# Patient Record
Sex: MALE | Race: WHITE | NOT HISPANIC OR LATINO | URBAN - METROPOLITAN AREA
[De-identification: names, ages, dates, MRNs, and addresses within clinical notes are randomized per-mention and may not be internally consistent; named-entity substitution may affect disease eponyms.]

---

## 2020-03-18 ENCOUNTER — APPOINTMENT (OUTPATIENT)
Dept: URBAN - METROPOLITAN AREA SURGERY 18 | Age: 75
Setting detail: DERMATOLOGY
End: 2020-03-19

## 2020-03-18 VITALS — DIASTOLIC BLOOD PRESSURE: 80 MMHG | HEART RATE: 85 BPM | SYSTOLIC BLOOD PRESSURE: 137 MMHG

## 2020-03-18 PROBLEM — C44.212 BASAL CELL CARCINOMA OF SKIN OF RIGHT EAR AND EXTERNAL AURICULAR CANAL: Status: ACTIVE | Noted: 2020-03-18

## 2020-03-18 PROCEDURE — 17312 MOHS ADDL STAGE: CPT

## 2020-03-18 PROCEDURE — 17311 MOHS 1 STAGE H/N/HF/G: CPT

## 2020-03-18 PROCEDURE — 15260 FTH/GFT FR N/E/E/L 20 SQCM/<: CPT

## 2020-03-18 PROCEDURE — OTHER PRESCRIPTION: OTHER

## 2020-03-18 PROCEDURE — OTHER REFERRAL CORRESPONDENCE: OTHER

## 2020-03-18 PROCEDURE — OTHER MOHS SURGERY: OTHER

## 2020-03-26 ENCOUNTER — APPOINTMENT (OUTPATIENT)
Dept: URBAN - METROPOLITAN AREA SURGERY 18 | Age: 75
Setting detail: DERMATOLOGY
End: 2020-03-28

## 2020-03-26 DIAGNOSIS — Z48.817 ENCOUNTER FOR SURGICAL AFTERCARE FOLLOWING SURGERY ON THE SKIN AND SUBCUTANEOUS TISSUE: ICD-10-CM

## 2020-03-26 PROCEDURE — OTHER POST-OP WOUND CHECK: OTHER

## 2020-03-26 PROCEDURE — 99024 POSTOP FOLLOW-UP VISIT: CPT

## 2020-03-26 ASSESSMENT — LOCATION ZONE DERM: LOCATION ZONE: EAR

## 2020-03-26 ASSESSMENT — LOCATION SIMPLE DESCRIPTION DERM: LOCATION SIMPLE: RIGHT EAR

## 2020-03-26 ASSESSMENT — LOCATION DETAILED DESCRIPTION DERM: LOCATION DETAILED: RIGHT SUPERIOR POSTERIOR HELIX

## 2020-04-02 ENCOUNTER — APPOINTMENT (OUTPATIENT)
Dept: URBAN - METROPOLITAN AREA SURGERY 18 | Age: 75
Setting detail: DERMATOLOGY
End: 2020-04-04

## 2020-04-02 DIAGNOSIS — Z48.817 ENCOUNTER FOR SURGICAL AFTERCARE FOLLOWING SURGERY ON THE SKIN AND SUBCUTANEOUS TISSUE: ICD-10-CM

## 2020-04-02 PROCEDURE — OTHER POST-OP WOUND CHECK: OTHER

## 2020-04-02 PROCEDURE — 99024 POSTOP FOLLOW-UP VISIT: CPT

## 2020-04-02 ASSESSMENT — LOCATION ZONE DERM: LOCATION ZONE: EAR

## 2020-04-02 ASSESSMENT — LOCATION DETAILED DESCRIPTION DERM: LOCATION DETAILED: RIGHT SUPERIOR POSTERIOR HELIX

## 2020-04-02 ASSESSMENT — LOCATION SIMPLE DESCRIPTION DERM: LOCATION SIMPLE: RIGHT EAR

## 2020-04-02 NOTE — PROCEDURE: POST-OP WOUND CHECK
Additional Comments: Graft site appears well vascularized. Continue daily dressing changes until fully healed. May start to get wet daily.\\n\\nDr. Lloyd's note - seen/agree with above. Discussed anterolateral tilt to ear at present point in healing process. We discussed bandaging and avoiding heavy objects (i.e glasses) that may add additional weight to the ear and fix the ear in a \"cupped\" position. We expect much of this will resolve as the graft contracts. Given the extent of the tumor (involving much of the posterior ear/postauricular sulcus and postauricular neck) some cupping may remain and could be corrected in the future with surgical revision. The patient was shown how to bandage the ear in proper position so that the ear is somewhat casted in place. Should he notice any increase in cupping/anterolateral displacement of the ear he will call and return to clinic for evaluation and potential intervention. Otherwise given the current community COVID transmission the patient will follow up in clinic when safer to do so (~3 months) but knows to call and return to clinic sooner if he has any complication/concern.
Wound Evaluated By: Dr. Cliff Desai
Add 31220 Cpt? (Important Note: In 2017 The Use Of 75950 Is Being Tracked By Cms To Determine Future Global Period Reimbursement For Global Periods): yes
Detail Level: Detailed

## 2021-03-28 NOTE — PROCEDURE: MOHS SURGERY
183 Mucosal Advancement Flap Text: Given the location of the defect, shape of the defect and the proximity to free margins a mucosal advancement flap was deemed most appropriate. Incisions were made with a 15 blade scalpel in the appropriate fashion along the cutaneous vermilion border and the mucosal lip. The remaining actinically damaged mucosal tissue was excised.  The mucosal advancement flap was then elevated to the gingival sulcus with care taken to preserve the neurovascular structures and advanced into the primary defect. Care was taken to ensure that precise realignment of the vermilion border was achieved.

## 2022-01-21 NOTE — PROCEDURE: MOHS SURGERY
4 = No assist / stand by assistance Split-Thickness Skin Graft Text: The defect edges were debeveled with a #15 scalpel blade.  Given the location of the defect, shape of the defect and the proximity to free margins a split thickness skin graft was deemed most appropriate.  Using a sterile surgical marker, the primary defect shape was transferred to the donor site. The split thickness graft was then harvested.  The skin graft was then placed in the primary defect and oriented appropriately.

## 2022-03-15 ENCOUNTER — APPOINTMENT (OUTPATIENT)
Dept: URBAN - METROPOLITAN AREA SURGERY 18 | Age: 77
Setting detail: DERMATOLOGY
End: 2022-03-16

## 2022-03-15 VITALS — DIASTOLIC BLOOD PRESSURE: 82 MMHG | HEART RATE: 103 BPM | SYSTOLIC BLOOD PRESSURE: 122 MMHG

## 2022-03-15 PROBLEM — C44.212 BASAL CELL CARCINOMA OF SKIN OF RIGHT EAR AND EXTERNAL AURICULAR CANAL: Status: ACTIVE | Noted: 2022-03-15

## 2022-03-15 PROCEDURE — OTHER COUNSELING: OTHER

## 2022-03-15 PROCEDURE — 17311 MOHS 1 STAGE H/N/HF/G: CPT

## 2022-03-15 PROCEDURE — OTHER MOHS SURGERY: OTHER

## 2022-03-15 PROCEDURE — OTHER REFERRAL CORRESPONDENCE: OTHER

## 2022-03-15 PROCEDURE — 17312 MOHS ADDL STAGE: CPT

## 2022-03-15 PROCEDURE — 15260 FTH/GFT FR N/E/E/L 20 SQCM/<: CPT

## 2022-03-15 NOTE — PROCEDURE: MOHS SURGERY
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

## 2022-03-15 NOTE — HPI: PROCEDURE (MOHS)
Has The Growth Been Previously Biopsied?: has been previously biopsied
Additional History: The patient has previously had many lesions on the right ear treated with liquid nitrogen which may have overlapped with the surgical site.
Year Removed: 1900

## 2022-03-16 ENCOUNTER — APPOINTMENT (OUTPATIENT)
Dept: URBAN - METROPOLITAN AREA SURGERY 18 | Age: 77
Setting detail: DERMATOLOGY
End: 2022-03-16

## 2022-03-16 DIAGNOSIS — Z48.817 ENCOUNTER FOR SURGICAL AFTERCARE FOLLOWING SURGERY ON THE SKIN AND SUBCUTANEOUS TISSUE: ICD-10-CM

## 2022-03-16 DIAGNOSIS — L76.21 POSTPROCEDURAL HEMORRHAGE OF SKIN AND SUBCUTANEOUS TISSUE FOLLOWING A DERMATOLOGIC PROCEDURE: ICD-10-CM

## 2022-03-16 PROCEDURE — OTHER DRESSING CHANGE: OTHER

## 2022-03-16 PROCEDURE — 99024 POSTOP FOLLOW-UP VISIT: CPT

## 2022-03-16 PROCEDURE — OTHER POST-OP WOUND CHECK: OTHER

## 2022-03-16 ASSESSMENT — LOCATION ZONE DERM: LOCATION ZONE: EAR

## 2022-03-16 ASSESSMENT — LOCATION SIMPLE DESCRIPTION DERM: LOCATION SIMPLE: RIGHT EAR

## 2022-03-16 ASSESSMENT — LOCATION DETAILED DESCRIPTION DERM: LOCATION DETAILED: RIGHT ANTIHELIX

## 2022-03-16 NOTE — PROCEDURE: POST-OP WOUND CHECK
Wound Evaluated By: Dr. Cliff Desai
Detail Level: Detailed
Add 13009 Cpt? (Important Note: In 2017 The Use Of 91789 Is Being Tracked By Cms To Determine Future Global Period Reimbursement For Global Periods): yes

## 2022-03-16 NOTE — PROCEDURE: DRESSING CHANGE
Wound Evaluated By: Dr. Cliff Desai
Detail Level: Detailed
Add 09148 Cpt? (Important Note: In 2017 The Use Of 18392 Is Being Tracked By Cms To Determine Future Global Period Reimbursement For Global Periods): no
Free Text Wound Car (Will Override Above): Patient returns to clinic for post op bleeding overnight from surgical site, Right Antihelix. Patient reports that site started to ooze last night, patient states that he put pressure to site for \"approximately 10 minutes\", then he reports he took an Ibuprofen, and went to bed. The next morning patient reports that the bandage was soaked with blood and continuing to run  down his neck. Patient also reports 10/10 pain and tenderness globally to ear. Patient was brought into clinic by wife. Bandage was soaked though with blood. Bandage was removed and large clot noted on top of graft site as well as focal slow oozing from superior portion of graft and focal slow oozing from second intention area. Clot was removed and  direct pressure was applied for 30 minutes. After pressure was applied bleeding stopped.  Patient was given 6cc of 1% lidocaine with 1:100,000 epinephrine and 408mcg clindamycin/ml and a 1:10 solution of 8.4% sodium bicarbonate to area for pain. Site was cleaned with sterile saline and surgical soap. Applied drysol to epidermal edges and focal areas for additional hemostasis. Applied gelfoam, Vaseline, Telfa pad, gauze, and Hypafix tape with pressure bandage along with a stockinette. Detailed postoperative instructions were given. Advised patient to keep head elevated, try Tylenol instead for pain, and reinforced instructions to hold pressure for at least 30 minutes if bleeding occurs. Advised patient to call with any other complications or concerns. Should bleeding resume after hours will travel to nearest ER or urgent care. Patient will follow up one week post op or sooner of concerns arise.

## 2022-03-23 ENCOUNTER — APPOINTMENT (OUTPATIENT)
Dept: URBAN - METROPOLITAN AREA SURGERY 18 | Age: 77
Setting detail: DERMATOLOGY
End: 2022-03-25

## 2022-03-23 VITALS — HEART RATE: 86 BPM | SYSTOLIC BLOOD PRESSURE: 139 MMHG | DIASTOLIC BLOOD PRESSURE: 91 MMHG

## 2022-03-23 DIAGNOSIS — Z48.817 ENCOUNTER FOR SURGICAL AFTERCARE FOLLOWING SURGERY ON THE SKIN AND SUBCUTANEOUS TISSUE: ICD-10-CM

## 2022-03-23 PROBLEM — C44.219 BASAL CELL CARCINOMA OF SKIN OF LEFT EAR AND EXTERNAL AURICULAR CANAL: Status: ACTIVE | Noted: 2022-03-23

## 2022-03-23 PROCEDURE — 17311 MOHS 1 STAGE H/N/HF/G: CPT | Mod: 79

## 2022-03-23 PROCEDURE — OTHER COUNSELING: OTHER

## 2022-03-23 PROCEDURE — OTHER REFERRAL CORRESPONDENCE: OTHER

## 2022-03-23 PROCEDURE — 99024 POSTOP FOLLOW-UP VISIT: CPT

## 2022-03-23 PROCEDURE — OTHER POST-OP WOUND CHECK: OTHER

## 2022-03-23 PROCEDURE — OTHER MOHS SURGERY: OTHER

## 2022-03-23 ASSESSMENT — LOCATION DETAILED DESCRIPTION DERM: LOCATION DETAILED: RIGHT ANTIHELIX

## 2022-03-23 ASSESSMENT — LOCATION SIMPLE DESCRIPTION DERM: LOCATION SIMPLE: RIGHT EAR

## 2022-03-23 ASSESSMENT — LOCATION ZONE DERM: LOCATION ZONE: EAR

## 2022-03-23 NOTE — PROCEDURE: MOHS SURGERY
23-Dec-2021 20:00 Consent 3/Introductory Paragraph: I gave the patient a chance to ask questions they had about the procedure.  Following this I explained the Mohs procedure and consent was obtained. The risks, benefits and alternatives to therapy were discussed in detail. Specifically, the risks of infection, scarring, bleeding, prolonged wound healing, incomplete removal, allergy to anesthesia, nerve injury and recurrence were addressed. Prior to the procedure, the treatment site was clearly identified and confirmed by the patient. All components of Universal Protocol/PAUSE Rule completed.

## 2022-03-23 NOTE — PROCEDURE: POST-OP WOUND CHECK
Additional Comments: Patient presents for a 1 week post Mohs wound check. Graft is healing appropriately and is well vascularized. Wound was cleaned with hibiclens and rinsed with sterile saline. Applied Vaseline, telfa, and hypafix tape. Advised patient to leave bandage in place for 1 week. Patient to follow-up in 1 week with next Mohs surgery.

## 2022-03-23 NOTE — PROCEDURE: MOHS SURGERY
Problem: At Risk for Falls  Goal: # Patient does not fall  Outcome: Outcome Met, Continue evaluating goal progress toward completion  Goal: # Takes action to control fall-related risks  Outcome: Outcome Met, Continue evaluating goal progress toward completion  Goal: # Verbalizes understanding of fall risk/precautions  Description: Document education using the patient education activity  Outcome: Outcome Met, Continue evaluating goal progress toward completion     Problem: At Risk for Injury Due to Fall  Goal: # Patient does not fall  Outcome: Outcome Met, Continue evaluating goal progress toward completion  Goal: # Takes action to control condition specific risks  Outcome: Outcome Met, Continue evaluating goal progress toward completion  Goal: # Verbalizes understanding of fall-related injury personal risks  Description: Document education using the patient education activity  Outcome: Outcome Met, Continue evaluating goal progress toward completion     Problem: Pain  Goal: #Acceptable pain level achieved/maintained at rest using NRS/Faces  Description: This goal is used for patients who can self-report.  Acceptable means the level is at or below the identified comfort/function goal.  Outcome: Outcome Met, Continue evaluating goal progress toward completion  Goal: # Acceptable pain level achieved/maintained at rest using NRS/Faces without oversedation (opioid naive or PCA/Epidural infusion)  Description: This goal is used if Opioid-naïve or on PCA/Epidural Infusion.  Outcome: Outcome Met, Continue evaluating goal progress toward completion  Goal: # Acceptable pain level achieved/maintained with activity using NRS/Faces  Description: This goal is used for patients who can self-report and are not achieving acceptable pain control during activity.  Outcome: Outcome Met, Continue evaluating goal progress toward completion  Goal: Acceptable pain/comfort level is achieved/maintained at rest (based on Pain Behaviors  Scale)  Description: This goal is used for patients who are not able to self-report pain and are assessed for pain using the Pain Behaviors Scale  Outcome: Outcome Met, Continue evaluating goal progress toward completion  Goal: Acceptable pain/comfort level is achieved/maintained at rest based on PAINAID scale (Dementia)  Description: This goal is used for patients who are not able to self-report pain, have dementia, and assessed using the PAINAD scale.  Outcome: Outcome Met, Continue evaluating goal progress toward completion  Goal: Acceptable pain/comfort level is achieved/maintained at rest (based on pediatric behavior tool: NIPS, NPASS, or FLACC)  Description: This goal is used for pediatric patients who are not able to self report pain.  Outcome: Outcome Met, Continue evaluating goal progress toward completion  Goal: # Verbalizes understanding of pain management  Description: Documented in Patient Education Activity  Outcome: Outcome Met, Continue evaluating goal progress toward completion     Problem: Seizures  Goal: Seizure activity controlled  Outcome: Outcome Met, Continue evaluating goal progress toward completion  Goal: Protected from injury if a seizure occurs  Outcome: Outcome Met, Continue evaluating goal progress toward completion  Goal: Verbalizes understanding of seizures and treatment plan  Description: Document education using the patient education activity.   Outcome: Outcome Met, Continue evaluating goal progress toward completion     Problem: Hemodialysis  Goal: Fistula/graft intact as evidenced by presence of bruit & thrill  Outcome: Outcome Met, Continue evaluating goal progress toward completion  Goal: Vascular access device site free of signs & symptoms of infection  Outcome: Outcome Met, Continue evaluating goal progress toward completion  Goal: Dialysis: Safe, effective, and comfortable hemodialysis treatment (Hemodialysis nurse only)  Outcome: Outcome Met, Continue evaluating goal  progress toward completion  Goal: Dialysis: Free of complications related to initiation/termination of dialysis (Hemodialysis nurse only)  Outcome: Outcome Met, Continue evaluating goal progress toward completion  Goal: Verbalizes understanding of hemodialysis care  Description: Document on Patient Education Activity  Outcome: Outcome Met, Continue evaluating goal progress toward completion     Problem: Diabetes  Goal: Glycemic balance achieved/maintained  Description: Goal is to maintain blood sugar within range with no episodes of hypoglycemia  Outcome: Outcome Met, Continue evaluating goal progress toward completion  Goal: Verbalizes/demonstrates understanding of Diabetes  Description: Document on Patient Education Activity  Outcome: Outcome Met, Continue evaluating goal progress toward completion  Goal: Demonstrates ability to self-administer insulin  Description: Document on Patient Education Activity  Outcome: Outcome Met, Continue evaluating goal progress toward completion      [place] Border Text: The closure involved the vermilion border.

## 2022-03-23 NOTE — PROCEDURE: POST-OP WOUND CHECK
Add 17447 Cpt? (Important Note: In 2017 The Use Of 56967 Is Being Tracked By Cms To Determine Future Global Period Reimbursement For Global Periods): yes

## 2022-03-30 ENCOUNTER — APPOINTMENT (OUTPATIENT)
Dept: URBAN - METROPOLITAN AREA SURGERY 18 | Age: 77
Setting detail: DERMATOLOGY
End: 2022-03-30

## 2022-03-30 VITALS — DIASTOLIC BLOOD PRESSURE: 88 MMHG | SYSTOLIC BLOOD PRESSURE: 140 MMHG | HEART RATE: 88 BPM

## 2022-03-30 DIAGNOSIS — Z48.817 ENCOUNTER FOR SURGICAL AFTERCARE FOLLOWING SURGERY ON THE SKIN AND SUBCUTANEOUS TISSUE: ICD-10-CM

## 2022-03-30 PROBLEM — C44.319 BASAL CELL CARCINOMA OF SKIN OF OTHER PARTS OF FACE: Status: ACTIVE | Noted: 2022-03-30

## 2022-03-30 PROCEDURE — 99024 POSTOP FOLLOW-UP VISIT: CPT

## 2022-03-30 PROCEDURE — OTHER COUNSELING: OTHER

## 2022-03-30 PROCEDURE — 17311 MOHS 1 STAGE H/N/HF/G: CPT | Mod: 79

## 2022-03-30 PROCEDURE — 13132 CMPLX RPR F/C/C/M/N/AX/G/H/F: CPT | Mod: 79

## 2022-03-30 PROCEDURE — OTHER REFERRAL CORRESPONDENCE: OTHER

## 2022-03-30 PROCEDURE — OTHER MOHS SURGERY: OTHER

## 2022-03-30 PROCEDURE — OTHER POST-OP WOUND CHECK: OTHER

## 2022-03-30 ASSESSMENT — LOCATION SIMPLE DESCRIPTION DERM
LOCATION SIMPLE: RIGHT EAR
LOCATION SIMPLE: LEFT EAR

## 2022-03-30 ASSESSMENT — LOCATION DETAILED DESCRIPTION DERM
LOCATION DETAILED: LEFT TRIANGULAR FOSSA
LOCATION DETAILED: RIGHT ANTIHELIX

## 2022-03-30 ASSESSMENT — LOCATION ZONE DERM: LOCATION ZONE: EAR

## 2022-03-30 NOTE — PROCEDURE: POST-OP WOUND CHECK
Additional Comments: Patient presents for a 15 day post Mohs wound check. Graft site is well vascularized and contoured. Wound was cleaned with hibiclens and rinsed with sterile saline. Applied Vaseline, telfa, and hypafix tape. Advised patient to continue daily cleaning and dressing changes until fully healed.
Wound Evaluated By: Dr. Cliff Desai
Additional Comments: Patient presents for a 1 week post Mohs wound check. Surgical site is granulating appropriately. Wound was cleaned with hibiclens and rinsed with sterile saline. Applied Vaseline, telfa, and hypafix tape. Advised patient to start daily cleaning and dressing changes until fully healed.
Add 66542 Cpt? (Important Note: In 2017 The Use Of 03989 Is Being Tracked By Cms To Determine Future Global Period Reimbursement For Global Periods): yes
Detail Level: Detailed

## 2022-04-14 ENCOUNTER — APPOINTMENT (OUTPATIENT)
Dept: URBAN - METROPOLITAN AREA SURGERY 18 | Age: 77
Setting detail: DERMATOLOGY
End: 2022-04-18

## 2022-04-14 DIAGNOSIS — Z48.817 ENCOUNTER FOR SURGICAL AFTERCARE FOLLOWING SURGERY ON THE SKIN AND SUBCUTANEOUS TISSUE: ICD-10-CM

## 2022-04-14 PROCEDURE — 99024 POSTOP FOLLOW-UP VISIT: CPT

## 2022-04-14 PROCEDURE — OTHER POST-OP WOUND CHECK: OTHER

## 2022-04-14 ASSESSMENT — LOCATION ZONE DERM
LOCATION ZONE: FACE
LOCATION ZONE: EAR

## 2022-04-14 ASSESSMENT — LOCATION SIMPLE DESCRIPTION DERM
LOCATION SIMPLE: LEFT EAR
LOCATION SIMPLE: RIGHT EAR
LOCATION SIMPLE: LEFT CHEEK

## 2022-04-14 ASSESSMENT — LOCATION DETAILED DESCRIPTION DERM
LOCATION DETAILED: LEFT CENTRAL MALAR CHEEK
LOCATION DETAILED: RIGHT ANTIHELIX
LOCATION DETAILED: LEFT TRIANGULAR FOSSA

## 2022-04-14 NOTE — PROCEDURE: POST-OP WOUND CHECK
Add 47298 Cpt? (Important Note: In 2017 The Use Of 11321 Is Being Tracked By Cms To Determine Future Global Period Reimbursement For Global Periods): yes
Additional Comments: Patient presents for 3 week wound check. Site has some serosanguineous crusting visible with appropriate granulation tissue covering the cartilaginous wound bed. No signs/symptoms of poor wound healing or infection. Patient was educated to keep dressing on site until fully healed. Site was dressed with Vaseline \\n,telfa and tape. Follow up in 4 weeks.
Detail Level: Detailed
Additional Comments: Patient presents for 4 week wound check. Site has mild serosanguineous crusting visible. Patient states he has only been bandaging the area intermittently. Patient was educated to keep dressing on site until fully healed. Site was dressed with Vaseline,telfa and tape. Follow up in 3-4 weeks, sooner with any change, new onset symptoms at surgical site.
Additional Comments: Patient presents for 2 week wound check, site is well approximated. Patient no longer needs bandage and can begin gentle massage is 3 weeks. Patient educated on evidence of spitting suture. Follow up in 4 weeks.
Wound Evaluated By: Dr. Cliff Desai

## 2022-05-27 ENCOUNTER — APPOINTMENT (OUTPATIENT)
Dept: URBAN - METROPOLITAN AREA SURGERY 18 | Age: 77
Setting detail: DERMATOLOGY
End: 2022-05-29

## 2022-05-27 DIAGNOSIS — Z48.817 ENCOUNTER FOR SURGICAL AFTERCARE FOLLOWING SURGERY ON THE SKIN AND SUBCUTANEOUS TISSUE: ICD-10-CM

## 2022-05-27 PROCEDURE — 99024 POSTOP FOLLOW-UP VISIT: CPT

## 2022-05-27 PROCEDURE — OTHER POST-OP WOUND CHECK: OTHER

## 2022-05-27 ASSESSMENT — LOCATION DETAILED DESCRIPTION DERM
LOCATION DETAILED: LEFT TRIANGULAR FOSSA
LOCATION DETAILED: RIGHT ANTIHELIX
LOCATION DETAILED: LEFT CENTRAL MALAR CHEEK

## 2022-05-27 ASSESSMENT — LOCATION SIMPLE DESCRIPTION DERM
LOCATION SIMPLE: LEFT CHEEK
LOCATION SIMPLE: RIGHT EAR
LOCATION SIMPLE: LEFT EAR

## 2022-05-27 ASSESSMENT — LOCATION ZONE DERM
LOCATION ZONE: EAR
LOCATION ZONE: FACE

## 2022-05-27 NOTE — PROCEDURE: POST-OP WOUND CHECK
Additional Comments: Patient presents eight weeks post mohs. The surgical site as well healed. One spitting stitch with gently removed from the top edge of the suture line. The patient has no questions or concerns. He is pleased with his cosmetic appearance. Patient was educated on diligent sunblock application. No need for follow up unless requested.
Add 27555 Cpt? (Important Note: In 2017 The Use Of 47511 Is Being Tracked By Cms To Determine Future Global Period Reimbursement For Global Periods): yes
Detail Level: Detailed
Additional Comments: Patient presents nine weeks post mohs. The surgical site as well healed. The patient has no questions or concerns. He is pleased with his cosmetic appearance. Patient was educated on diligent sunblock application. No need for follow up unless requested.
Wound Evaluated By: Dr. Cliff Desai
Wound Evaluated By: Dr. Cliff Desai
Additional Comments: Patient presents ten weeks post mohs. The surgical site as well healed. There is some contraction of the graft within normal limits for graft maturation, patient is pleased with how the area has healed. He has no questions or concerns. Patient was educated on diligent sunblock application. No need for follow up unless requested.
Wound Evaluated By: Dr. Cliff Desai

## 2022-07-26 NOTE — PROCEDURE: MOHS SURGERY
Your Hemoglobin is normal.   Your platelets are normal.  Your electrolytes are stable.  Your Glucose is normal  Your kidney function is stable.  Your liver function is stable.  Your cholesterol panel is abnormal.  We encourage you to diet and exercise. Please follow-up with us in 3 months with fasting blood test.  If still abnormal than will discuss a role of medication.  Your TSH is normal.   Your vitamin D level is low. You need treatment with prescription dose of Vitamin D.  I have sent the medication to the pharmacy. Recommend a follow up visit in 6 months for a level check.  Your vitamin B12 is normal.  Your folic acid is normal  Your urine test is stable.   Mixed Superficial And Nodular Bcc Histology Text: Histologic features of both superficial (Multiple foci of “budding” basaloid tumor with peripheral palisading is attached superficially to the epidermis and/or adjacent superficial hair follicle(s) and is surrounded by a myxoid stroma) AND nodular (Discrete nests/nodules of malignant basaloid tumor is present within the dermis and/or attached to the epidermis. The tumor demonstrates palisading and is retracted away from a surrounding mucoid stroma) basal cell carcinoma are present.

## 2022-12-04 NOTE — PROCEDURE: MOHS SURGERY
1.98 Double Island Pedicle Flap Text: The defect edges were debeveled with a #15 scalpel blade.  Given the location of the defect, shape of the defect and the proximity to free margins a double island pedicle advancement flap was deemed most appropriate.  Using a sterile surgical marker, an appropriate advancement flap was drawn incorporating the defect, outlining the appropriate donor tissue and placing the expected incisions within the relaxed skin tension lines where possible.    The area thus outlined was incised deep to adipose tissue with a #15 scalpel blade.  The skin margins were undermined to an appropriate distance in all directions around the primary defect and laterally outward around the island pedicle utilizing iris scissors.  There was minimal undermining beneath the pedicle flap.

## 2022-12-12 NOTE — PROCEDURE: MOHS SURGERY
1. \"Have you been to the ER, urgent care clinic since your last visit? Hospitalized since your last visit? \" No    2. \"Have you seen or consulted any other health care providers outside of the [address] [place] [address] since your last visit? \" No     3. For patients aged 39-70: Has the patient had a colonoscopy / FIT/ Cologuard? NA - based on age      If the patient is female:    4. For patients aged 41-77: Has the patient had a mammogram within the past 2 years? NA - based on age or sex      11. For patients aged 21-65: Has the patient had a pap smear?  NA - based on age or sex    Health Maintenance Due   Topic Date Due    DTaP/Tdap/Td series (1 - Tdap) 02/08/2019    COVID-19 Vaccine (3 - Booster for [employer] series) 01/07/2022 Anesthesia Volume In Cc: 7.5

## 2023-01-06 NOTE — PROCEDURE: MOHS SURGERY
RL today 0.3mm- Poss cautery with KK with good success with plug and poss permanent fix. Primary Defect Width In Cm (Final Defect Size - Required For Flaps/Grafts): 2.9

## 2023-02-23 NOTE — PROCEDURE: MOHS SURGERY
Area H Indication Text: Tumors in this location are included in Area H (eyelids, eyebrows, nose, lips, chin, ear, pre-auricular, post-auricular, temple, genitalia, hands, feet, ankles and areola).  Tissue conservation is critical in these anatomic locations. Abdomen soft, non-tender, no guarding; old ecchymosis to anterior abdomen;

## 2024-08-03 NOTE — PROCEDURE: MOHS SURGERY
Complex Repair Preamble Text (Leave Blank If You Do Not Want): The risks, benefits, and possible outcomes of this procedure were discussed along with the risks, benefits, and possible outcomes of other options for wound repair (including but not limited to: variations of complex closure, flaps, grafts, and second intention). The patient verbalized understanding and consent to the outlined procedure. The patient understands should the surgical site heal in a manner they find unsatisfactory further interventions or referral to an additional specialist may be required. Following sterile preparation and drape, two standing cones were removed at opposite ends of the postoperative defect within, or parallel to, the relaxed skin tension lines. The resulting defect was undermined widely and bilaterally in the appropriate surgical plane taking care to preserve local arteries, veins, nerves, and other structures of anatomic importance. Hemostasis was achieved and then the wound was sutured together in layered fashion. Statement Selected

## 2025-07-11 NOTE — PROCEDURE: POST-OP WOUND CHECK
Wound Evaluated By: Dr. Cliff Desai
Add 03923 Cpt? (Important Note: In 2017 The Use Of 78989 Is Being Tracked By Cms To Determine Future Global Period Reimbursement For Global Periods): yes
Additional Comments: Both graft and donor sites healing within normal limits. Graft appears well vascularized and crusting removed from graft site with sterile saline soaked q-tips. Wound care reviewed and both sites dressed appropriately. \\n\\nDr. Lloyd's notes - seen/agree with above. Extensive discussion regarding continued care, expected appearance during first weeks to months, signs symptoms of poor wound healing/infection and surgical site complications. Given currrent risk of COVID-19 in our community discussed plan for follow up (in clinic with concerns, changes, new onset signs/symptoms at surgical site; by phone/televisit if possible to limit risk of COVID exposure). Patient in favor of this plan. Understands need to balance risk for possible viral exposure with appropriate medical follow up and postoperative care. At present surgical site healing very well without any signs/symptoms of postoperative complication. Patient will call and RTC with any questions/concerns/changes, otherwise plan on longer term follow up once viral pandemic abates.
Detail Level: Detailed
Unknown

## 2025-07-17 NOTE — PROCEDURE: MOHS SURGERY
Patient calling with following questions:    Provider: Vidya aSeed MD    Patient having the following symptoms/issues: Patient called and states she is in excruciating pain and is wondering if Dr. Saeed can possibly do anything to help. She states the pain is everywhere in the body but specificall states its in back and radiating down legs and arms. Pain is higher than a 10.    Please call patient at the following number:  Mobile [phone]     Patient states that it is okay to leave a detailed message.    Patient was informed that the patient would receive a phone call back within 2 business days, unless this request is STAT.    Preferred times to return call: anytime   Epidermal Autograft Text: The defect edges were debeveled with a #15 scalpel blade.  Given the location of the defect, shape of the defect and the proximity to free margins an epidermal autograft was deemed most appropriate.  Using a sterile surgical marker, the primary defect shape was transferred to the donor site. The epidermal graft was then harvested.  The skin graft was then placed in the primary defect and oriented appropriately.